# Patient Record
Sex: MALE | Race: BLACK OR AFRICAN AMERICAN | Employment: FULL TIME | URBAN - METROPOLITAN AREA
[De-identification: names, ages, dates, MRNs, and addresses within clinical notes are randomized per-mention and may not be internally consistent; named-entity substitution may affect disease eponyms.]

---

## 2024-10-02 ENCOUNTER — HOSPITAL ENCOUNTER (EMERGENCY)
Age: 34
Discharge: HOME | End: 2024-10-02
Attending: EMERGENCY MEDICINE

## 2024-10-02 VITALS
TEMPERATURE: 98 F | SYSTOLIC BLOOD PRESSURE: 163 MMHG | DIASTOLIC BLOOD PRESSURE: 83 MMHG | OXYGEN SATURATION: 98 % | HEART RATE: 90 BPM | RESPIRATION RATE: 16 BRPM
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
## 2024-10-02 PROCEDURE — 90714 TD VACC NO PRESV 7 YRS+ IM: CPT | Performed by: EMERGENCY MEDICINE

## 2024-10-02 PROCEDURE — 6360000002 HC RX W HCPCS: Performed by: EMERGENCY MEDICINE

## 2024-10-02 PROCEDURE — 99284 EMERGENCY DEPT VISIT MOD MDM: CPT

## 2024-10-02 PROCEDURE — 90471 IMMUNIZATION ADMIN: CPT | Performed by: EMERGENCY MEDICINE

## 2024-10-02 RX ADMIN — CLOSTRIDIUM TETANI TOXOID ANTIGEN (FORMALDEHYDE INACTIVATED) AND CORYNEBACTERIUM DIPHTHERIAE TOXOID ANTIGEN (FORMALDEHYDE INACTIVATED) 0.5 ML: 5; 2 INJECTION, SUSPENSION INTRAMUSCULAR at 22:20

## 2024-10-02 ASSESSMENT — LIFESTYLE VARIABLES
HOW OFTEN DO YOU HAVE A DRINK CONTAINING ALCOHOL: MONTHLY OR LESS
HOW MANY STANDARD DRINKS CONTAINING ALCOHOL DO YOU HAVE ON A TYPICAL DAY: 1 OR 2

## 2024-10-03 NOTE — DISCHARGE INSTRUCTIONS
Keep the area clean with soap and water.  Cover it while working but then leave it open while not working.  Take the full course of antibiotics as prescribed.  Use thin layer of Neosporin or triple antibiotic ointment over the affected area twice daily.

## 2024-10-03 NOTE — ED NOTES
Patient mobility status  with no difficulty. Provider aware     I have reviewed discharge instructions with the patient.  The patient verbalized understanding.    Patient left ED via Discharge Method: ambulatory to Home with Guardian.    Opportunity for questions and clarification provided.     Patient given 1 scripts.

## 2024-10-03 NOTE — ED TRIAGE NOTES
Pt. A/ox4 and ambulatory to triage. Pt. C/o dog bite on right index finger. P.t states being bit by their own dog and dog is up to date with shots

## 2024-10-03 NOTE — ED PROVIDER NOTES
Emergency Department Provider Note       PCP: No primary care provider on file.   Age: 33 y.o.   Sex: male     DISPOSITION Decision To Discharge 10/02/2024 10:10:37 PM  Condition at Disposition: Data Unavailable       ICD-10-CM    1. Dog bite, initial encounter  W54.0XXA           Medical Decision Making     Ddx:    Dog bite, puncture wound, rabies,  ED Course as of 10/02/24 2216   Wed Oct 02, 2024   2215 I talked to the patient about management of the wound.  His tetanus is not up-to-date so he will be given a tetanus shot here in the emergency department.  Patient will be started on Augmentin secondary to the puncture wound.  I talked to him about the importance of taking the antibiotics.  Patient expressed understanding and agreement. [KH]      ED Course User Index  [KH] Latrell Palomino, DO     1 acute, uncomplicated illness or injury.  Prescription drug management performed.  Shared medical decision making was utilized in creating the patients health plan today.    I independently ordered and reviewed each unique test.                     History     33-year-old male presenting to the emergency department today after getting bitten on the tip of his right index finger by his dog.  He was feeding his dog treat and the dog got overly excited and accidentally got part of his finger.  The patient states that his dog shots are all up-to-date.  He says his tetanus is out of date however.  The patient has bleeding controlled with pressure.          ROS     Review of Systems   Skin:  Positive for wound.        Physical Exam     Vitals signs and nursing note reviewed:  Vitals:    10/02/24 2146   BP: (!) 163/83   Pulse: 90   Resp: 16   Temp: 98 °F (36.7 °C)   TempSrc: Oral   SpO2: 98%      Physical Exam     GENERAL:The patient has There is no height or weight on file to calculate BMI. Well-hydrated.  No acute distress.  VITAL SIGNS: Heart rate, blood pressure, respiratory rate reviewed as recorded in  nurse's